# Patient Record
Sex: MALE | Race: WHITE
[De-identification: names, ages, dates, MRNs, and addresses within clinical notes are randomized per-mention and may not be internally consistent; named-entity substitution may affect disease eponyms.]

---

## 2017-01-21 NOTE — PD
HPI


Chief Complaint:   Complaint


Time Seen by Provider:  11:15


Travel History


International Travel<30 days:  No


Contact w/Intl Traveler<30days:  No


Traveled to known affect area:  No





History of Present Illness


HPI


Patient comes in complaining of epididymitis on his right testicle that began 

approximately week ago.  Patient states pains got worse over the past several 

days.  Patient reports this is his fourth episode of epididymitis since having 

a vasectomy, but reports it usually affects his left testicle.  Patient 

describes pain as sharp and achy like in nature without radiation.  Denies any 

dysuria, abdominal pain, or fevers.  Patient states 2 weeks ago was was sick 

with a sore throat and fever that lasted a couple of days and is uncertain if 

this may have anything to do with that or not.  Patient states his urologist 

liquids and Cipro whenever he gets this, but is uncertain if he has ever been 

diagnosed with a infectious process causing it.





formerly [facility]


Past Medical History


*** Narrative Medical


Epididymitis





Social History


Alcohol Use:  No


Tobacco Use:  No


Substance Use:  No





Allergies-Medications


(Allergen,Severity, Reaction):  


Coded Allergies:  


     No Known Allergies (Verified , 1/21/17)


Reported Meds & Prescriptions





Reported Meds & Active Scripts


Active


Cipro (Ciprofloxacin HCl) 500 Mg Tab 500 Mg PO BID 7 Days


Diclofenac Sodium DR (Diclofenac Sodium) 75 Mg Tabdr 75 Mg PO Q12HR PRN








Review of Systems


Except as stated in HPI:  all other systems reviewed are Neg





Physical Exam


Narrative


GENERAL: Well-developed, well nourished, in no acute distress, and non-ill 

appearing.


SKIN: Warm and dry.


HEAD: Atraumatic. Normocephalic. 


EYES: Pupils equal and round. EOMI. No scleral icterus. No injection or 

drainage. 


ENT: No nasal bleeding or discharge.  Mucous membranes pink and moist.


NECK: Trachea midline. Supple.  No nuclear rigidity.


RESPIRATORY: No accessory muscle use.  No respiratory distress. 


GASTROINTESTINAL: Abdomen soft, non-tender, nondistended. Hepatic and splenic 

margins not palpable. No pulsatile mass.


GENITOURINARY:  Uncircumcised. Testes descended bilaterally without evidence of 

rotation.  No lesions or erythema. No urethral discharge.  Right epididymis is 

swollen and tender compared to left.


MUSCULOSKELETAL: No obvious deformities. No clubbing.  No cyanosis.  No edema.  

Full range of motion.


NEUROLOGICAL: Awake and alert. No obvious cranial nerve deficits.  Motor 

grossly within normal limits. Normal speech.


PSYCHIATRIC: Appropriate mood and affect; insight and judgment normal.





Data


Data


Last Documented VS





Vital Signs








  Date Time  Temp Pulse Resp B/P Pulse Ox O2 Delivery O2 Flow Rate FiO2


 


1/21/17 10:40 97.7 80 16 139/82 98 Room Air  








Orders





 Urinalysis - C+S If Indicated (1/21/17 11:19)


Gc And Chlamydia Pcr (1/21/17 11:19)


Sodium Chloride 0.9% Flush (Ns Flush) (1/21/17 11:30)


Urine Culture (1/21/17 12:07)





Labs








 Laboratory Tests








Test 1/21/17





 11:45


 


Urine Color LIGHT-YELLOW 


 


Urine Turbidity CLEAR 


 


Urine pH 5.5 


 


Urine Specific Gravity 1.004 


 


Urine Protein NEG mg/dL


 


Urine Glucose (UA) NEG mg/dL


 


Urine Ketones NEG mg/dL


 


Urine Occult Blood NEG 


 


Urine Nitrite NEG 


 


Urine Bilirubin NEG 


 


Urine Urobilinogen LESS THAN 2.0





 MG/DL


 


Urine Leukocyte Esterase NEG 


 


Urine RBC LESS THAN 1





 /hpf


 


Microscopic Urinalysis Comment CULT NOT





 INDICATED














MDM


Medical Decision Making


Medical Screen Exam Complete:  Yes


Emergency Medical Condition:  Yes


Differential Diagnosis


Epididymitis, orchitis, STD, other


Narrative Course


The patient presents with symptoms and exam classic for epididymitis. There is 

no evidence by history to suggest torsion. On exam the testicle is in normal 

position. The right epididymis is enlarged and tender. There are no external 

lesions or ulcerations. The patient was treated. The patient was instructed to 

follow up with his urologist.  Discussed potential side effects of Cipro 

including tendon rupture. Patient agreed with plan.





Patient in no obvious distress upon re-evaluation.  Discussed patient with Dr. Cummings, who is in agreement with plan of care and disposition.  Patient was asked 

if they wanted to speak to my attending, which the patient did not wish to do 

at this time.  Any questions/concerns in reference to patient diagnosis/

condition discussed and clarified prior to patient's discharge. Reinforced 

sheer importance of close follow up with patient's primary physician or primary 

care clinic. Instructed patient to return to ED immediately, if symptoms return/

worsen. Pt showed understanding of above instructions.  Further instructions 

and recommendations were detailed in discharge paperwork.  Pt ambulated without 

difficulty out of ED at discharge.





Diagnosis





 Primary Impression:  


 Epididymitis, right


Patient Instructions:  Epididymitis (ED), General Instructions





***Additional Instructions:


Follow-up with your primary care physician and/or urologist this upcoming week 

for reevaluation.  Take all medication as prescribed.  Return to the emergency 

department if symptoms get worse.


***Med/Other Pt SpecificInfo:  Prescription(s) given


Scripts


Ciprofloxacin (Cipro)500 Mg Gln213 Mg PO BID  7 Days  Ref 0


   Prov:Cecil Cummings MD         1/21/17 


Diclofenac Sodium DR 75 Mg Tabdr75 Mg PO Q12HR PRN (PAIN SCALE 1 TO 10) #20 TAB

  Ref 0


   Prov:Cecil Cummings MD         1/21/17


Disposition:  01 DISCHARGE HOME


Condition:  Stable








Dimitri Del Rio Jan 21, 2017 11:19

## 2018-05-11 ENCOUNTER — HOSPITAL ENCOUNTER (INPATIENT)
Dept: HOSPITAL 17 - NEPE | Age: 48
LOS: 3 days | Discharge: HOME | DRG: 494 | End: 2018-05-14
Attending: SURGERY | Admitting: SURGERY
Payer: COMMERCIAL

## 2018-05-11 DIAGNOSIS — S82.251B: Primary | ICD-10-CM

## 2018-05-11 DIAGNOSIS — S82.421A: ICD-10-CM

## 2018-05-11 DIAGNOSIS — F17.200: ICD-10-CM
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
## 2018-05-11 DIAGNOSIS — S82.431B: ICD-10-CM
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
## 2018-05-11 LAB
ANION GAP: 10 MEQ/L (ref 5–15)
APTT (PATIENT): 22.8 SEC (ref 24.3–30.1)
AUTOMATED NEUTROPHIL #: 4.3 TH/MM3 (ref 1.8–7.7)
BASOPHIL #: 0 TH/MM3 (ref 0–0.2)
BASOPHIL %: 0.6 % (ref 0–2)
BICARBONATE: 27.3 MEQ/L (ref 21–32)
BLOOD UREA NITROGEN: 14 MG/DL (ref 7–18)
CALCIUM: 8.6 MG/DL (ref 8.5–10.1)
CHLORIDE: 104 MEQ/L (ref 98–107)
CREATININE: 0.96 MG/DL (ref 0.6–1.3)
EOSINOPHIL #: 0.3 TH/MM3 (ref 0–0.4)
EOSINOPHIL %: 4 % (ref 0–4)
GLOMERULAR FILTRATION RATE: 83 ML/MIN (ref 89–?)
GLUCOSE,RANDOM: 123 MG/DL (ref 74–106)
HEMATOCRIT: 39.1 % (ref 39–51)
HEMO FLAGS: (no result)
HEMOGLOBIN: 14 GM/DL (ref 13–17)
INTERNATIONAL NORMALIZED RATIO: 1 RATIO
LYMPH %: 29.8 % (ref 9–44)
LYMPHOCYTE #: 2.2 TH/MM3 (ref 1–4.8)
MEAN CELL VOLUME: 88.9 FL (ref 80–100)
MEAN CORPUSCULAR HEMOGLOBIN: 31.9 PG (ref 27–34)
MEAN CORPUSCULAR HGB CONC: 35.9 % (ref 32–36)
MEAN PLATELET VOLUME: 7.4 FL (ref 7–11)
MONO %: 8 % (ref 0–8)
MONOCYTE #: 0.6 TH/MM3 (ref 0–0.9)
NEUT %: 57.6 % (ref 16–70)
PLATELET COUNT: 184 TH/MM3 (ref 150–450)
POTASSIUM: 3.4 MEQ/L (ref 3.5–5.1)
PROTHROMBIN TIME - PATIENT: 10 SEC (ref 9.8–11.6)
RED BLOOD COUNT: 4.39 MIL/MM3 (ref 4.5–5.9)
RED CELL DISTRIBUTION WIDTH: 12.8 % (ref 11.6–17.2)
SODIUM (NA): 141 MEQ/L (ref 136–145)
WHITE BLOOD COUNT: 7.5 TH/MM3 (ref 4–11)

## 2018-05-11 PROCEDURE — 97161 PT EVAL LOW COMPLEX 20 MIN: CPT

## 2018-05-11 PROCEDURE — 93005 ELECTROCARDIOGRAM TRACING: CPT

## 2018-05-11 PROCEDURE — 73620 X-RAY EXAM OF FOOT: CPT

## 2018-05-11 PROCEDURE — 97116 GAIT TRAINING THERAPY: CPT

## 2018-05-11 PROCEDURE — 90715 TDAP VACCINE 7 YRS/> IM: CPT

## 2018-05-11 PROCEDURE — 85610 PROTHROMBIN TIME: CPT

## 2018-05-11 PROCEDURE — 73590 X-RAY EXAM OF LOWER LEG: CPT

## 2018-05-11 PROCEDURE — 80048 BASIC METABOLIC PNL TOTAL CA: CPT

## 2018-05-11 PROCEDURE — 86901 BLOOD TYPING SEROLOGIC RH(D): CPT

## 2018-05-11 PROCEDURE — 86902 BLOOD TYPE ANTIGEN DONOR EA: CPT

## 2018-05-11 PROCEDURE — 86850 RBC ANTIBODY SCREEN: CPT

## 2018-05-11 PROCEDURE — 72040 X-RAY EXAM NECK SPINE 2-3 VW: CPT

## 2018-05-11 PROCEDURE — 86077 PHYS BLOOD BANK SERV XMATCH: CPT

## 2018-05-11 PROCEDURE — 86870 RBC ANTIBODY IDENTIFICATION: CPT

## 2018-05-11 PROCEDURE — 97110 THERAPEUTIC EXERCISES: CPT

## 2018-05-11 PROCEDURE — 76000 FLUOROSCOPY <1 HR PHYS/QHP: CPT

## 2018-05-11 PROCEDURE — 86900 BLOOD TYPING SEROLOGIC ABO: CPT

## 2018-05-11 PROCEDURE — 94150 VITAL CAPACITY TEST: CPT

## 2018-05-11 PROCEDURE — 86920 COMPATIBILITY TEST SPIN: CPT

## 2018-05-11 PROCEDURE — 85730 THROMBOPLASTIN TIME PARTIAL: CPT

## 2018-05-11 PROCEDURE — 85027 COMPLETE CBC AUTOMATED: CPT

## 2018-05-11 PROCEDURE — 86922 COMPATIBILITY TEST ANTIGLOB: CPT

## 2018-05-11 PROCEDURE — 85025 COMPLETE CBC W/AUTO DIFF WBC: CPT

## 2018-05-11 RX ADMIN — CEFAZOLIN SODIUM IN SODIUM CHLORIDE 0.9% IV SOLN 2 GM/100ML 1 MLS/HR: 2-0.9/1 SOLUTION at 23:00

## 2018-05-11 RX ADMIN — CEFAZOLIN SODIUM 1 MLS/HR: 2 SOLUTION INTRAVENOUS at 23:53

## 2018-05-11 RX ADMIN — TETANUS TOXOID, REDUCED DIPHTHERIA TOXOID AND ACELLULAR PERTUSSIS VACCINE, ADSORBED 1 ML: 5; 2.5; 8; 8; 2.5 SUSPENSION INTRAMUSCULAR at 23:52

## 2018-05-12 PROCEDURE — 0QBJ0ZZ EXCISION OF RIGHT FIBULA, OPEN APPROACH: ICD-10-PCS

## 2018-05-12 PROCEDURE — 0QSG06Z REPOSITION RIGHT TIBIA WITH INTRAMEDULLARY INTERNAL FIXATION DEVICE, OPEN APPROACH: ICD-10-PCS

## 2018-05-12 PROCEDURE — 0LQL0ZZ REPAIR RIGHT UPPER LEG TENDON, OPEN APPROACH: ICD-10-PCS

## 2018-05-12 RX ADMIN — METHOCARBAMOL 1 MG: 500 TABLET ORAL at 21:54

## 2018-05-12 RX ADMIN — STANDARDIZED SENNA CONCENTRATE AND DOCUSATE SODIUM 1 TAB: 8.6; 5 TABLET, FILM COATED ORAL at 21:54

## 2018-05-12 RX ADMIN — CLINDAMYCIN PHOSPHATE 1 MG: 150 INJECTION, SOLUTION INTRAMUSCULAR; INTRAVENOUS at 09:45

## 2018-05-12 RX ADMIN — GABAPENTIN 1 MG: 300 CAPSULE ORAL at 18:13

## 2018-05-12 RX ADMIN — ACETAMINOPHEN 1 MLS/HR: 10 INJECTION, SOLUTION INTRAVENOUS at 09:41

## 2018-05-12 RX ADMIN — PHENYTOIN SODIUM 1 MLS/HR: 50 INJECTION INTRAMUSCULAR; INTRAVENOUS at 02:05

## 2018-05-12 RX ADMIN — FAMOTIDINE 1 MG: 10 INJECTION, SOLUTION INTRAVENOUS at 09:00

## 2018-05-12 RX ADMIN — NICARDIPINE HYDROCHLORIDE 1 MLS/HR: 2.5 INJECTION INTRAVENOUS at 09:07

## 2018-05-12 RX ADMIN — CEFAZOLIN SODIUM 1 MG: 1 POWDER, FOR SOLUTION INTRAMUSCULAR; INTRAVENOUS at 07:53

## 2018-05-12 RX ADMIN — HYDROMORPHONE HYDROCHLORIDE 1 MG: 2 INJECTION INTRAMUSCULAR; INTRAVENOUS; SUBCUTANEOUS at 05:59

## 2018-05-12 RX ADMIN — CEFAZOLIN SODIUM 1 MLS/HR: 2 SOLUTION INTRAVENOUS at 09:12

## 2018-05-12 RX ADMIN — BACITRACIN 1 APPLIC: 500 OINTMENT TOPICAL at 21:55

## 2018-05-12 RX ADMIN — MORPHINE SULFATE 1 MG: 4 INJECTION, SOLUTION INTRAMUSCULAR; INTRAVENOUS at 11:31

## 2018-05-12 RX ADMIN — HYDROMORPHONE HYDROCHLORIDE 1 MG: 2 INJECTION INTRAMUSCULAR; INTRAVENOUS; SUBCUTANEOUS at 01:12

## 2018-05-12 RX ADMIN — METHOCARBAMOL 1 MG: 500 TABLET ORAL at 15:41

## 2018-05-12 RX ADMIN — BACITRACIN 1 APPLIC: 500 OINTMENT TOPICAL at 09:00

## 2018-05-12 RX ADMIN — HYDROMORPHONE HYDROCHLORIDE 1 MG: 2 INJECTION INTRAMUSCULAR; INTRAVENOUS; SUBCUTANEOUS at 21:54

## 2018-05-12 RX ADMIN — Medication 1 ML: at 21:54

## 2018-05-12 RX ADMIN — METHOCARBAMOL 1 MG: 500 TABLET ORAL at 06:45

## 2018-05-12 RX ADMIN — HYDROMORPHONE HYDROCHLORIDE 1 MG: 2 INJECTION INTRAMUSCULAR; INTRAVENOUS; SUBCUTANEOUS at 08:50

## 2018-05-12 RX ADMIN — Medication 1 ML: at 09:00

## 2018-05-12 RX ADMIN — VANCOMYCIN HYDROCHLORIDE 1 MG: 1 INJECTION, SOLUTION INTRAVENOUS at 09:15

## 2018-05-12 RX ADMIN — FAMOTIDINE 1 MG: 10 INJECTION, SOLUTION INTRAVENOUS at 21:54

## 2018-05-13 LAB
ANION GAP: 6 MEQ/L (ref 5–15)
BICARBONATE: 29.1 MEQ/L (ref 21–32)
BLOOD UREA NITROGEN: 16 MG/DL (ref 7–18)
CALCIUM: 8.3 MG/DL (ref 8.5–10.1)
CHLORIDE: 105 MEQ/L (ref 98–107)
CREATININE: 1.08 MG/DL (ref 0.6–1.3)
GLOMERULAR FILTRATION RATE: 72 ML/MIN (ref 89–?)
GLUCOSE,RANDOM: 109 MG/DL (ref 74–106)
HEMATOCRIT: 34.7 % (ref 39–51)
HEMOGLOBIN: 11.8 GM/DL (ref 13–17)
MEAN CELL VOLUME: 90.9 FL (ref 80–100)
MEAN CORPUSCULAR HEMOGLOBIN: 31 PG (ref 27–34)
MEAN CORPUSCULAR HGB CONC: 34.1 % (ref 32–36)
MEAN PLATELET VOLUME: 7.2 FL (ref 7–11)
PLATELET COUNT: 176 TH/MM3 (ref 150–450)
POTASSIUM: 4.2 MEQ/L (ref 3.5–5.1)
RED BLOOD COUNT: 3.82 MIL/MM3 (ref 4.5–5.9)
RED CELL DISTRIBUTION WIDTH: 13.4 % (ref 11.6–17.2)
REVIEW FLAG: (no result)
SODIUM (NA): 140 MEQ/L (ref 136–145)
WHITE BLOOD COUNT: 6.9 TH/MM3 (ref 4–11)

## 2018-05-13 RX ADMIN — STANDARDIZED SENNA CONCENTRATE AND DOCUSATE SODIUM 1 TAB: 8.6; 5 TABLET, FILM COATED ORAL at 20:17

## 2018-05-13 RX ADMIN — METHOCARBAMOL 1 MG: 500 TABLET ORAL at 23:39

## 2018-05-13 RX ADMIN — ENOXAPARIN SODIUM 1 MG: 40 INJECTION SUBCUTANEOUS at 11:26

## 2018-05-13 RX ADMIN — GABAPENTIN 1 MG: 300 CAPSULE ORAL at 12:36

## 2018-05-13 RX ADMIN — BACITRACIN 1 APPLIC: 500 OINTMENT TOPICAL at 08:35

## 2018-05-13 RX ADMIN — METHOCARBAMOL 1 MG: 500 TABLET ORAL at 12:36

## 2018-05-13 RX ADMIN — STANDARDIZED SENNA CONCENTRATE AND DOCUSATE SODIUM 1 TAB: 8.6; 5 TABLET, FILM COATED ORAL at 07:41

## 2018-05-13 RX ADMIN — GABAPENTIN 1 MG: 300 CAPSULE ORAL at 07:41

## 2018-05-13 RX ADMIN — GABAPENTIN 1 MG: 300 CAPSULE ORAL at 17:38

## 2018-05-13 RX ADMIN — HYDROMORPHONE HYDROCHLORIDE 1 MG: 2 INJECTION INTRAMUSCULAR; INTRAVENOUS; SUBCUTANEOUS at 01:48

## 2018-05-13 RX ADMIN — HYDROMORPHONE HYDROCHLORIDE 1 MG: 2 INJECTION INTRAMUSCULAR; INTRAVENOUS; SUBCUTANEOUS at 07:41

## 2018-05-13 RX ADMIN — HYDROCODONE BITARTRATE AND ACETAMINOPHEN 1 TAB: 7.5; 325 TABLET ORAL at 20:16

## 2018-05-13 RX ADMIN — BACITRACIN 1 APPLIC: 500 OINTMENT TOPICAL at 21:00

## 2018-05-13 RX ADMIN — Medication 1 ML: at 20:17

## 2018-05-13 RX ADMIN — METHOCARBAMOL 1 MG: 500 TABLET ORAL at 06:00

## 2018-05-13 RX ADMIN — Medication 1 ML: at 07:42

## 2018-05-13 RX ADMIN — MULTIPLE VITAMINS W/ MINERALS TAB 1 TAB: TAB at 07:42

## 2018-05-13 RX ADMIN — HYDROCODONE BITARTRATE AND ACETAMINOPHEN 1 TAB: 7.5; 325 TABLET ORAL at 12:36

## 2018-05-13 RX ADMIN — FAMOTIDINE 1 MG: 10 INJECTION, SOLUTION INTRAVENOUS at 07:41

## 2018-05-14 LAB
ANION GAP: 6 MEQ/L (ref 5–15)
BICARBONATE: 32.5 MEQ/L (ref 21–32)
BLOOD UREA NITROGEN: 13 MG/DL (ref 7–18)
CALCIUM: 7.8 MG/DL (ref 8.5–10.1)
CHLORIDE: 101 MEQ/L (ref 98–107)
CREATININE: 0.81 MG/DL (ref 0.6–1.3)
GLOMERULAR FILTRATION RATE: 101 ML/MIN (ref 89–?)
GLUCOSE,RANDOM: 99 MG/DL (ref 74–106)
HEMATOCRIT: 31.8 % (ref 39–51)
HEMOGLOBIN: 11.2 GM/DL (ref 13–17)
MEAN CELL VOLUME: 90.6 FL (ref 80–100)
MEAN CORPUSCULAR HEMOGLOBIN: 31.9 PG (ref 27–34)
MEAN CORPUSCULAR HGB CONC: 35.3 % (ref 32–36)
MEAN PLATELET VOLUME: 7.4 FL (ref 7–11)
PLATELET COUNT: 154 TH/MM3 (ref 150–450)
POTASSIUM: 3.9 MEQ/L (ref 3.5–5.1)
RED BLOOD COUNT: 3.51 MIL/MM3 (ref 4.5–5.9)
RED CELL DISTRIBUTION WIDTH: 12.9 % (ref 11.6–17.2)
REVIEW FLAG: (no result)
SODIUM (NA): 139 MEQ/L (ref 136–145)
WHITE BLOOD COUNT: 6.8 TH/MM3 (ref 4–11)

## 2018-05-14 RX ADMIN — MULTIPLE VITAMINS W/ MINERALS TAB 1 TAB: TAB at 07:53

## 2018-05-14 RX ADMIN — BACITRACIN 1 APPLIC: 500 OINTMENT TOPICAL at 07:55

## 2018-05-14 RX ADMIN — Medication 1 ML: at 07:53

## 2018-05-14 RX ADMIN — METHOCARBAMOL 1 MG: 500 TABLET ORAL at 14:04

## 2018-05-14 RX ADMIN — ENOXAPARIN SODIUM 1 MG: 40 INJECTION SUBCUTANEOUS at 10:51

## 2018-05-14 RX ADMIN — GABAPENTIN 1 MG: 300 CAPSULE ORAL at 07:53

## 2018-05-14 RX ADMIN — HYDROCODONE BITARTRATE AND ACETAMINOPHEN 1 TAB: 7.5; 325 TABLET ORAL at 15:55

## 2018-05-14 RX ADMIN — HYDROCODONE BITARTRATE AND ACETAMINOPHEN 1 TAB: 7.5; 325 TABLET ORAL at 05:14

## 2018-05-14 RX ADMIN — METHOCARBAMOL 1 MG: 500 TABLET ORAL at 05:14

## 2018-05-14 RX ADMIN — HYDROCODONE BITARTRATE AND ACETAMINOPHEN 1 TAB: 7.5; 325 TABLET ORAL at 10:52

## 2018-05-14 RX ADMIN — GABAPENTIN 1 MG: 300 CAPSULE ORAL at 12:29

## 2018-05-14 RX ADMIN — STANDARDIZED SENNA CONCENTRATE AND DOCUSATE SODIUM 1 TAB: 8.6; 5 TABLET, FILM COATED ORAL at 07:53
